# Patient Record
Sex: FEMALE | Race: WHITE | Employment: FULL TIME | URBAN - METROPOLITAN AREA
[De-identification: names, ages, dates, MRNs, and addresses within clinical notes are randomized per-mention and may not be internally consistent; named-entity substitution may affect disease eponyms.]

---

## 2020-05-06 ENCOUNTER — OFFICE VISIT (OUTPATIENT)
Dept: PHYSICAL THERAPY | Facility: HOSPITAL | Age: 35
End: 2020-05-06
Payer: COMMERCIAL

## 2020-05-06 ENCOUNTER — ORDER TRANSCRIPTION (OUTPATIENT)
Dept: PHYSICAL THERAPY | Facility: HOSPITAL | Age: 35
End: 2020-05-06

## 2020-05-06 PROCEDURE — 97110 THERAPEUTIC EXERCISES: CPT

## 2020-05-06 PROCEDURE — 97112 NEUROMUSCULAR REEDUCATION: CPT

## 2020-05-06 PROCEDURE — 97162 PT EVAL MOD COMPLEX 30 MIN: CPT

## 2020-05-06 NOTE — PROGRESS NOTES
MUSCULOSKELETAL AND PELVIC FLOOR EVALUATION:   Referring Physician: Dr. Marlena Beckham  Diagnosis: Postpartum state  PT diagnosis: Pelvic floor weakness     Date of Service: 5/6/2020     PATIENT SUMMARY   Suzanna Mandel is a 36year old female  who presents t 6 glasses of water  Bladder irritants: no  Urine Stop test: unable to perform successfully  Post void dribble: yes  Hovering: no  Empty bladder just in case: yes  Do you ever leak urine without knowing it? no    BOWEL HABITS  Types of symptoms: other gas B    Special Tests  SLR negative    Informed consent for internal pelvic evaluation given: Yes    External Observation:   Voluntary contraction: present   Voluntary relaxation: present  Involuntary contraction: present  Involuntary relaxation: present    M involved/activity limitations, and evolving symptoms including stress urinary incontinence and L sciatica, bowel urgency, diastasis recti, pelvic floor pressure  PLAN OF CARE:    Goals: (to be met in 12 visits)  1.  Independent in HEP and progression with i questions, please contact me at Dept: [phone]    Sincerely,  Electronically signed by therapist: Ute Gaytan, PT  [de-identified] certification required: Yes  I certify the need for these services furnished under this plan of treatment and while und

## 2020-05-13 ENCOUNTER — OFFICE VISIT (OUTPATIENT)
Dept: PHYSICAL THERAPY | Facility: HOSPITAL | Age: 35
End: 2020-05-13
Attending: OBSTETRICS & GYNECOLOGY
Payer: COMMERCIAL

## 2020-05-13 PROCEDURE — 97110 THERAPEUTIC EXERCISES: CPT

## 2020-05-13 PROCEDURE — 97140 MANUAL THERAPY 1/> REGIONS: CPT

## 2020-05-13 NOTE — PROGRESS NOTES
Dx:  Postpartum state  PT diagnosis: Pelvic floor weakness          Authorized # of Visits:  No c/p, no auth, POC 12         Next MD visit: none scheduled  Fall Risk: standard         Precautions: Latex-mild         Child-Scarlet    Subjective: Able to stop 1x/night for improved sleep. 5. PFM contraction before increase intra-abdominal pressure.   6. Pt will improve transversus abdominis recruitment to perform proper isometric contraction without requiring verbal or tactile cuing to promote advancement of the

## 2020-05-20 ENCOUNTER — OFFICE VISIT (OUTPATIENT)
Dept: PHYSICAL THERAPY | Facility: HOSPITAL | Age: 35
End: 2020-05-20
Attending: OBSTETRICS & GYNECOLOGY
Payer: COMMERCIAL

## 2020-05-20 PROCEDURE — 97112 NEUROMUSCULAR REEDUCATION: CPT

## 2020-05-20 PROCEDURE — 97110 THERAPEUTIC EXERCISES: CPT

## 2020-05-20 NOTE — PROGRESS NOTES
Dx:  Postpartum state  PT diagnosis: Pelvic floor weakness          Authorized # of Visits:  No c/p, no auth, POC 12         Next MD visit: none scheduled  Fall Risk: standard         Precautions: Latex-mild         Child-Scarlet    Subjective: Had 1 episod bladder/bowel health, bladder retraining and long-term management. 2. Patient will demonstrate improved bladder voiding habits to voiding every 3 hours without urinary leakage.   3. Patient will demonstrate improve PFM isolation, coordination and strengt clamshell RTB x10 R/L     Kegel + articulating bridge x10      Issued HEP for above and latex free RTB  + Transverse abdominis contraction with all exercises    Standing-  Kegel + iso hip add + squat w ball behind back x20     Sit on ball-  quick flicks x2

## 2020-05-27 ENCOUNTER — APPOINTMENT (OUTPATIENT)
Dept: PHYSICAL THERAPY | Facility: HOSPITAL | Age: 35
End: 2020-05-27
Attending: OBSTETRICS & GYNECOLOGY
Payer: COMMERCIAL

## 2020-05-28 ENCOUNTER — OFFICE VISIT (OUTPATIENT)
Dept: PHYSICAL THERAPY | Facility: HOSPITAL | Age: 35
End: 2020-05-28
Attending: OBSTETRICS & GYNECOLOGY
Payer: COMMERCIAL

## 2020-05-28 PROCEDURE — 97140 MANUAL THERAPY 1/> REGIONS: CPT

## 2020-05-28 PROCEDURE — 97110 THERAPEUTIC EXERCISES: CPT

## 2020-05-28 PROCEDURE — 97112 NEUROMUSCULAR REEDUCATION: CPT

## 2020-05-28 NOTE — PROGRESS NOTES
Dx: Postpartum state  PT diagnosis: Pelvic floor weakness          Authorized # of Visits:  No c/p, no auth, POC 12         Next MD visit: none scheduled  Fall Risk: standard         Precautions: Latex-mild         Child-Scarlet    Subjective:  Went for a r in supine but standing too difficult. Goals:   Goals: (to be met in 12 visits)-progressing  1. Independent in HEP and progression with improved understanding of bladder/bowel health, bladder retraining and long-term management.     2. Patient will demons lumbar paraspinals, SI joint, pirifromis, sciatic nerve       Ther Ex:   diaphragmatic breathing , abdominal bracing + kegel + with coordinated breathing   x10    Kegel +  iso hip add  10\" x10     Kegel +  Resisted   ER RTB  x10     Kegel +  U/LE lift  x1

## 2020-06-03 ENCOUNTER — OFFICE VISIT (OUTPATIENT)
Dept: PHYSICAL THERAPY | Facility: HOSPITAL | Age: 35
End: 2020-06-03
Attending: OBSTETRICS & GYNECOLOGY
Payer: COMMERCIAL

## 2020-06-03 PROCEDURE — 97110 THERAPEUTIC EXERCISES: CPT

## 2020-06-03 PROCEDURE — 97112 NEUROMUSCULAR REEDUCATION: CPT

## 2020-06-03 PROCEDURE — 97140 MANUAL THERAPY 1/> REGIONS: CPT

## 2020-06-03 NOTE — PROGRESS NOTES
Dx:  Postpartum state  PT diagnosis: Pelvic floor weakness          Authorized # of Visits:  No c/p, no auth, POC 12         Next MD visit: none scheduled  Fall Risk: standard         Precautions: Latex-mild         Child-Scarlet    Subjective: Low back fee throughout to maintain neutral position since pt tends to arch lower back. No c/o pubic symphysis pain with standing in lunge position and dynamic partial lunging exs. Focused on core strengthening.     Goals:   Goals: (to be met in 12 visits)-progressing tone  Tonic  Phasic  [place] with coordinated breathing   [place]olphin      Biofeedback with constant interpretation of results.    manual therapy:    MET L anterior ilium x10    shotgun 10 sec x10    STM L lumbar paraspinals, SI joint, pirifromis, sciatic nerve UE    4 pt-  Cat/camel with coordinated breathing and kegel + /abd  s x15    4pt-  U/LE with coordinated breathing and kegel+ abdominal bracing  x10          Charges: [place] 1(15 min) NM Re-edx1 ( 15 min) MT (15 min)  Total Timed Treatment: 45 min     Total T

## 2020-06-10 ENCOUNTER — OFFICE VISIT (OUTPATIENT)
Dept: PHYSICAL THERAPY | Facility: HOSPITAL | Age: 35
End: 2020-06-10
Attending: OBSTETRICS & GYNECOLOGY
Payer: COMMERCIAL

## 2020-06-10 PROCEDURE — 97112 NEUROMUSCULAR REEDUCATION: CPT

## 2020-06-10 PROCEDURE — 97110 THERAPEUTIC EXERCISES: CPT

## 2020-06-10 NOTE — PROGRESS NOTES
Dx: Postpartum state  PT diagnosis: Pelvic floor weakness          Authorized # of Visits:  No c/p, no auth, POC 12         Next MD visit: none scheduled  Fall Risk: standard         Precautions: Latex-mild         Child-Scarlet    Subjective:  Went on a 15 lumbosacral weakness with core strengthening exs. Manual and verbal cueing throughout to maintain neutral position. No c/o pubic symphysis pain with standing in lunge position and dynamic partial lunging exs. Improved closure of rectus abdominis.  Focused o biofeedback   external sensor was placed and leads were attached  Resting tone  Tonic  Phasic  [place] with coordinated breathing   Dolphin      Biofeedback with constant interpretation of results.    manual therapy:    MET L anterior ilium x10    shotgun 10 x30    ASU BOSU alt x10 w 3 sec hold    BOSU alt partial lunge x10 w UE    4 pt-  Cat/camel with coordinated breathing and kegel + /abd  s x15    4pt-  U/LE with coordinated breathing and kegel+ abdominal bracing  x10   Ther Ex/NM Re-ed:     NuStep 5min L5

## 2020-06-17 ENCOUNTER — APPOINTMENT (OUTPATIENT)
Dept: PHYSICAL THERAPY | Facility: HOSPITAL | Age: 35
End: 2020-06-17
Attending: OBSTETRICS & GYNECOLOGY
Payer: COMMERCIAL

## 2020-06-24 ENCOUNTER — OFFICE VISIT (OUTPATIENT)
Dept: PHYSICAL THERAPY | Facility: HOSPITAL | Age: 35
End: 2020-06-24
Attending: OBSTETRICS & GYNECOLOGY
Payer: COMMERCIAL

## 2020-06-24 PROCEDURE — 97110 THERAPEUTIC EXERCISES: CPT

## 2020-06-24 PROCEDURE — 97112 NEUROMUSCULAR REEDUCATION: CPT

## 2020-06-24 NOTE — PROGRESS NOTES
Dx:  Postpartum state  PT diagnosis: Pelvic floor weakness          Authorized # of Visits:  No c/p, no auth, POC 12         Next MD visit: none scheduled  Fall Risk: standard         Precautions: Latex-mild         Child-Scarlet    Subjective: Has taken 3 to higher level lumbopelvic strengthening and stabilization exs without c/o pain. Re-iterated stress urinary incontinence strategies luis with + prolapse. Goals:   Goals: (to be met in 12 visits)-progressing  1.  Independent in HEP and progression with i constant interpretation of results.    manual therapy:    MET L anterior ilium x10    shotgun 10 sec x10    STM L lumbar paraspinals, SI joint, pirifromis, sciatic nerve Manual therapy:    STM L lumbar paraspinals, SI joint, piriformis, sciatic nerve - [facility] pt-  Cat/camel with coordinated breathing and kegel + /abd  s x15    4pt-  U/LE with coordinated breathing and kegel+ abdominal bracing  x10   Ther Ex/NM Re-ed:     NuStep 5min L5     Kegel +  iso hip add + bridge x30    kegel + bridge + alt knee kick x10

## 2020-07-01 ENCOUNTER — OFFICE VISIT (OUTPATIENT)
Dept: PHYSICAL THERAPY | Facility: HOSPITAL | Age: 35
End: 2020-07-01
Attending: OBSTETRICS & GYNECOLOGY
Payer: COMMERCIAL

## 2020-07-01 PROCEDURE — 97112 NEUROMUSCULAR REEDUCATION: CPT

## 2020-07-01 PROCEDURE — 97110 THERAPEUTIC EXERCISES: CPT

## 2020-07-01 NOTE — PROGRESS NOTES
Discharge Summary  Pt has attended 8 visits in Physical Therapy.   Dx: Postpartum state  PT diagnosis: Pelvic floor weakness          Authorized # of Visits:  No c/p, no auth, POC 12         Next MD visit: none scheduled  Fall Risk: standard         Precau restriction  Stage 1 cystocele    Assessment: Pt has made significant improvement in physical therapy with improved bladder/bowel habits, increased pelvic floor muscle, hip and abdominal strength, symmetrical pelvic region, and function with lifting and ju [phone].     Sincerely,  Electronically signed by therapist: Dwayne Domingo PT         Date: 5/13/2020  Tx#: 2/12 Date: 5/20/2020   Tx#: 3/ Date: 5/28/2020   Tx#: 4/ Date: 6/3/2020   Tx#: 5/ Date: 6/10/2020   Tx#: 6/ Date: 6/24/2020   Tx#: 7/ Date: lift x30    Plank 30 sec    Side plank R/L x15 min    4 pt-U/LE lift x20 w pelvic brace     stabilizer pressure biofeedback -  pelvic brace + alt march x30 40mmHg    standing-  stabilizer pressure biofeedback 40mmHg + alt march-too difficult Ther Ex/NM Re- 45 min     Total Treatment Time: 45 min

## (undated) NOTE — LETTER
Patient Name: Luis A Dorsey  YOB: 1985          MRN number:  [id number]  Date:  5/6/2020  Referring Physician:  [address] FLOOR EVALUATION:   Referring Physician: Dr. Margarita Meneses  Diagnosis: Postpartum st Leaking occurs: sneezing, LEE  Episodes of Leakage: variable  Amount of leakage: minimal  Pad use: mini pad  Pad Change frequency: variable  Nocturia: 2  Fluid Intake: 6 glasses of water  Bladder irritants: no  Urine Stop test: unable to perform successful Strength (MMT) 5/5 NAHID LE except hip abd L 4-/5 R 4/5, hip ext L 4-/5 R 4/5  Transverse Abdominis: 1/5    Flexibility Summary: WNL NAHID LE except hamstring -20 90/90 B    Special Tests  SLR negative    Informed consent for internal pelvic evaluation given: Based on clinical rationale and outcome measures, this evaluation involved Moderate Complexity decision making due to 1-2 personal factors/comorbidities, 3 body structures involved/activity limitations, and evolving symptoms including stress urinary incont treatment options and has agreed to actively participate in planning and for this course of care. Thank you for your referral. Please co-sign or sign and return this letter via fax as soon as possible to [phone].  If you have any questions, please c

## (undated) NOTE — LETTER
Patient Name: Juan Pablo Pichardo  YOB: 1985          MRN :  [id number]  Date:  7/7/2020  Referring Physician:  Karina Medina    Discharge Summary  Pt has attended 8 visits in Physical Therapy.   Dx: Postpartum state  PT diagnosis: Pelvic floor w Assessment: Pt has made significant improvement in physical therapy with improved bladder/bowel habits, increased pelvic floor muscle, hip and abdominal strength, symmetrical pelvic region, and function with lifting and jumping without pain.   All goals met Electronically signed by therapist: Randell Meyer, PT